# Patient Record
Sex: FEMALE | Race: WHITE | NOT HISPANIC OR LATINO | Employment: UNEMPLOYED | URBAN - METROPOLITAN AREA
[De-identification: names, ages, dates, MRNs, and addresses within clinical notes are randomized per-mention and may not be internally consistent; named-entity substitution may affect disease eponyms.]

---

## 2023-12-18 ENCOUNTER — OFFICE VISIT (OUTPATIENT)
Dept: ORTHOPEDIC SURGERY | Facility: CLINIC | Age: 53
End: 2023-12-18
Payer: COMMERCIAL

## 2023-12-18 DIAGNOSIS — S83.512D COMPLETE TEAR OF ANTERIOR CRUCIATE LIGAMENT OF LEFT KNEE, SUBSEQUENT ENCOUNTER: Primary | ICD-10-CM

## 2023-12-18 PROCEDURE — 99213 OFFICE O/P EST LOW 20 MIN: CPT | Performed by: ORTHOPAEDIC SURGERY

## 2023-12-18 PROCEDURE — 1036F TOBACCO NON-USER: CPT | Performed by: ORTHOPAEDIC SURGERY

## 2023-12-18 NOTE — PROGRESS NOTES
PRIMARY CARE PHYSICIAN: Rupesh Lake DO  REFERRING PROVIDER: No referring provider defined for this encounter.    Established Patient Evaluation      SUBJECTIVE  CHIEF COMPLAINT:   Chief Complaint   Patient presents with    Left Knee - Post-op        HPI: Yesica Oviedo is a 53 y.o. patient presenting for an established patient evaluation, 10 mo s/p left knee ACL reconstruction with allograft. Overall the patient is doing well and reports no issues. She has not attempted returning to pickleball and golf due to the winter, but does not have any specific complaints.  No adverse events noted.      REVIEW OF SYSTEMS  Constitutional: See HPI for pain assessment, No significant weight loss, recent trauma  Cardiovascular: No chest pain, shortness of breath  Respiratory: No difficulty breathing, cough  Gastrointestinal: No nausea, vomiting, diarrhea, constipation  Musculoskeletal: Noted in HPI, positive for pain, restricted motion, stiffness  Integumentary: No rashes, easy bruising, redness   Neurological: no numbness or tingling in extremities, no gait disturbances   Psychiatric: No mood changes, memory changes, social issues  Heme/Lymph: no excessive swelling, easy bruising, excessive bleeding  ENT: No hearing changes  Eyes: No vision changes    History reviewed. No pertinent past medical history.     Allergies   Allergen Reactions    Nickel Rash        History reviewed. No pertinent surgical history.     Family History   Problem Relation Name Age of Onset    No Known Problems Mother          Social History     Socioeconomic History    Marital status:      Spouse name: Not on file    Number of children: Not on file    Years of education: Not on file    Highest education level: Not on file   Occupational History    Not on file   Tobacco Use    Smoking status: Never    Smokeless tobacco: Never   Substance and Sexual Activity    Alcohol use: Not on file    Drug use: Not on file    Sexual activity: Not on  file   Other Topics Concern    Not on file   Social History Narrative    Not on file     Social Determinants of Health     Financial Resource Strain: Not on file   Food Insecurity: Not on file   Transportation Needs: Not on file   Physical Activity: Not on file   Stress: Not on file   Social Connections: Not on file   Intimate Partner Violence: Not on file   Housing Stability: Not on file        CURRENT MEDICATIONS:   No current outpatient medications on file.     No current facility-administered medications for this visit.        OBJECTIVE  PHYSICAL EXAM       No data to display                 53 y.o. year-old in no acute distress. Well nourished. Normal affect. Alert and oriented x 3.     Gait: Normal Tandem. Neutral alignment. No abnormalities of balance or coordination.  Skin: Intact over the bilateral upper and lower extremities. Incisions well healed. No erythema, ecchymosis, or temperature changes.    Right Knee:  ROM: 0-140 degrees. Negative crepitus.  No effusion.   Negative Joint Line Tenderness. Good quadriceps contraction. Intact extensor mechanism.    Left Knee:  ROM: 0-140 degrees. Negative crepitus.  No effusion.   Negative Joint Line Tenderness. Good quadriceps contraction. Intact extensor mechanism.  Stable Lachman.  Negative pivot shift.    Motor Strength: 5 out of 5 in the bilateral lower extremities.  Neuro: L4-S1 sensation intact grossly bilaterally.  Vascular: 2+ DP/PT pulses bilaterally. Bilateral lower extremity compartments supple.    ASSESSMENT & PLAN    Impression: 53 y.o. female 10 mo s/p left knee ACLR    Plan:  The patient is doing well and has no specific complaints. She has not attempted return to pickleball and golf due to the weather.    We discussed brace use during golf and pickle ball for the first season back to help protect the reconstruction. Following the first season, it is up to the patient if she would like to continue use.    While the patient plans on returning to  skiing, she should make sure to use her brace for this sport specifically. She should also take care to gradually increase volume and intensity when returning to skiing.    Follow-Up: Patient will follow-up as she feels necessary.    At the end of the visit, all questions were answered in full. The patient is in agreement with the plan and recommendations. They will call the office with any questions/concerns.    Note dictated with [employer] software. Completed without full typed error editing and sent to avoid delay.